# Patient Record
Sex: MALE | Race: WHITE | Employment: FULL TIME | URBAN - METROPOLITAN AREA
[De-identification: names, ages, dates, MRNs, and addresses within clinical notes are randomized per-mention and may not be internally consistent; named-entity substitution may affect disease eponyms.]

---

## 2019-12-31 ENCOUNTER — TRANSFERRED RECORDS (OUTPATIENT)
Dept: PHYSICAL THERAPY | Facility: CLINIC | Age: 54
End: 2019-12-31

## 2020-01-02 ENCOUNTER — THERAPY VISIT (OUTPATIENT)
Dept: PHYSICAL THERAPY | Facility: CLINIC | Age: 55
End: 2020-01-02
Payer: COMMERCIAL

## 2020-01-02 DIAGNOSIS — M54.50 LOW BACK PAIN RADIATING TO RIGHT LOWER EXTREMITY: ICD-10-CM

## 2020-01-02 DIAGNOSIS — M79.604 LOW BACK PAIN RADIATING TO RIGHT LOWER EXTREMITY: ICD-10-CM

## 2020-01-02 PROCEDURE — 97110 THERAPEUTIC EXERCISES: CPT | Mod: GP | Performed by: PHYSICAL THERAPIST

## 2020-01-02 PROCEDURE — 97161 PT EVAL LOW COMPLEX 20 MIN: CPT | Mod: GP | Performed by: PHYSICAL THERAPIST

## 2020-01-02 PROCEDURE — 97530 THERAPEUTIC ACTIVITIES: CPT | Mod: GP | Performed by: PHYSICAL THERAPIST

## 2020-01-02 PROCEDURE — 97112 NEUROMUSCULAR REEDUCATION: CPT | Mod: GP | Performed by: PHYSICAL THERAPIST

## 2020-01-02 NOTE — PROGRESS NOTES
[facility] Initial Evaluation  Subjective:  The history is provided by the patient. No  was used.   Mark Callejas being seen for low back pain.   Problem began 12/26/2019. Where condition occurred: at home, at work and in the community.Problem occurred: sore from moving boxes around for [place]. was taking Ibuprophen then fell on the ice and things have gotten worse since then  and reported as 8/10 (3/10. Hard to tell as I stop doing anything that hurts) on pain scale. General health as reported by patient is fair. Pertinent medical history includes:  Concussions/dizziness, numbness/tingling and overweight.   Other medical allergies details: penicillian.   Other surgery history details: toe 30 years ago.  Current medications:  Muscle relaxants and anti-inflammatory.   Primary job tasks include:  Computer work and prolonged sitting.  Pain is described as aching, burning, cramping, sharp, shooting and stabbing and is intermittent. Pain is worse during the day. Since onset symptoms are gradually improving.      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    Barriers include:  None as reported by patient (has some difficulty but I can do them meaning stairs).  Red flags:  Other (R big toe numbness doesn't trust the foot to support him when walking).   Condition occurred with:  A fall/slip.    Problem details: Ache for 2 months then  fell on the ice on the 28th made worse  Hip extension increases his back pain  calf pain12/31 just in calf comes when he does stretches feel fine right now  Patient to PT with one crutch due to feels like foot does not want to support him  Feels like may have some swelling  Feels like balance is off today he thinks due to his muscle relaxers  Likes hot showers got into tub and soaked a long time. Observing how poorly pt is moving I recommended pool and or  "whirlpool at his health club to help get him moving easier. Recommend water slippers  Pt had great difficulty going from sitting to standing \" can't bend\"  Feels he needs a new mattress. Recommended press board in between mattress and box spring.  .   Patient reports pain:  Lumbar spine right. Radiates to:  Foot right (to R calf). Associated symptoms:  Loss of motion/stiffness, numbness, tingling and loss of balance (balance he thinks is from meds). Symptoms are exacerbated by sitting, walking and standing and relieved by other (laying down and not moving relieves his pain).                      Objective:  Standing Alignment:    Cervical/Thoracic:  Forward head and flat thoracic upper spine  Shoulder/UE:  Rounded shoulders, protracted scapula L and protracted scapula R  Lumbar:  Lordosis incr, anterior pelvic tilt and high lumbar lordosis (lordosis goes up in to lower Thoracic spine)                           Lumbar/SI Evaluation  ROM:    AROM Lumbar:   Flexion:          0 motion from hip  Ext:                    10% no pain   Side Bend:        Left:  0     Right:  Rotates  Rotation:           Left:     Right:   Side Glide:        Left:     Right:                   Neural Tension/Mobility:      Left side:Femoral Nerve  negative.   Right side:   Slump positive.  Right side:   Femoral Nerve  negative.         Spinal Segmental Conclusions:         Level: Hypo note at L3, L4 and L5                                           the longer pt stood the more his pain and stiffness increased. Could not really perform March test too unstable standing on one leg even hanging onto table  Is starting to get radicular sx down RLE to foot with numbness and pt does not trust leg to hold him up  Tried manual traction no change.  Pt often gave vague answers when asked it better worse or no change often did not answer? Had to repeat questions     Prone with PT side gliding his lumbar spine  I could not tell by his response if anything " was helping or making worse. Question is his muscles relaxer's affecting him?  With supine his low back had increasing pain with his back pressing on table.  If this conts will send back to [facility] with sudden hard fall ? If needs  X rays   Pt got worse as we tried to eval his back today with increasing LLE sx. Advised to ice more    Pt carrying heavy back pack advised to wear it so wt centered on back  Pt stayed out in waiting room 30 min after session before he felt well enough to leave.Pt leaning heavily o his crutch Had I known I would have given him an ice pack.        General Evaluation:            Palpation:    Significant findings:  Paraspinal very taut in lower Thoracic and lumbar spine                                                       Review of Systems   Neurological: Positive for dizziness.   All other systems reviewed and are negative.  ? From his muscle relaxers normally does not have dizziness    Assessment/Plan:    Patient is a 54 year old male with lumbar and sacral complaints.    Patient has the following significant findings with corresponding treatment plan.                Diagnosis 1:  Low back pain with  Radicular sx now  Pain -  hot/cold therapy, US, mechanical traction, manual therapy, self management, education and home program  Decreased ROM/flexibility - manual therapy, therapeutic exercise, therapeutic activity and home program  Decreased joint mobility - manual therapy, therapeutic exercise, therapeutic activity and home program  Impaired gait - gait training, assistive devices and home program  Impaired muscle performance - neuro re-education and home program  Decreased function - therapeutic activities and home program  Cumulative Therapy Evaluation is: Low complexity.    Previous and current functional limitations:  (See Goal Flow Sheet for this information)    Short term and Long term goals: (See Goal Flow Sheet for this information)     Communication ability:  Patient appears to be  able to clearly communicate and understand verbal and written communication and follow directions correctly.  Treatment Explanation - The following has been discussed with the patient:   RX ordered/plan of care  Anticipated outcomes  Possible risks and side effects  This patient would benefit from PT intervention to resume normal activities.   Rehab potential is good.    Frequency:  1 X week, once daily  Duration:  for 8 weeks  Discharge Plan:  Achieve all LTG.  Independent in home treatment program.  Return to previous functional level by discharge.  Reach maximal therapeutic benefit.    Please refer to the daily flowsheet for treatment today, total treatment time and time spent performing 1:1 timed codes.

## 2020-01-02 NOTE — LETTER
[facility]  [address]  [facility] [address]  [phone]    2020    Re: Mark Callejas   :   1965  MRN:  [id number]   REFERRING PHYSICIAN:   Miquel Funes    [facility]    Date of Initial Evaluation:  2020  Visits:  Rxs Used: 1  Reason for Referral:  Low back pain radiating to right lower extremity    EVALUATION SUMMARY    Subjective:  The history is provided by the patient. No  was used.   Mark Callejas being seen for low back pain.   Problem began 2019. Where condition occurred: at home, at work and in the community.Problem occurred: sore from moving boxes around for [place]. was taking Ibuprophen then fell on the ice and things have gotten worse since then  and reported as 8/10 (3/10. Hard to tell as I stop doing anything that hurts) on pain scale. General health as reported by patient is fair. Pertinent medical history includes:  Concussions/dizziness, numbness/tingling and overweight.   Other medical allergies details: penicillian.   Other surgery history details: toe 30 years ago.  Current medications:  Muscle relaxants and anti-inflammatory.   Primary job tasks include:  Computer work and prolonged sitting.  Pain is described as aching, burning, cramping, sharp, shooting and stabbing and is intermittent. Pain is worse during the day. Since onset symptoms are gradually improving. 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  Barriers include:  None as reported by patient (has some difficulty but I can do them meaning stairs).  Red flags:  Other (R big toe numbness doesn't trust the foot to support him when walking).  Condition occurred with:  A fall/slip.    Problem details: Ache for 2 months then  fell on the ice on the  made  "worse  Hip extension increases his back pain  calf pain just in calf comes when he does stretches feel fine right now  Patient to PT with one crutch due to feels like foot does not want to support him  Feels like may have some swelling  Feels like balance is off today he thinks due to his muscle relaxers  Likes hot showers got into tub and soaked a long time. Observing how poorly pt is moving I recommended pool and or whirlpool at his health club to help get him moving easier. Recommend water slippers  Pt had great difficulty going from sitting to standing \" can't bend\"  Feels he needs a new mattress. Recommended press board in between mattress and box spring.  Patient reports pain:  Lumbar spine right. Radiates to:  Foot right (to R calf). Associated symptoms:  Loss of motion/stiffness, numbness, tingling and loss of balance   Re: Mark Callejas   :   1965      (balance he thinks is from meds). Symptoms are exacerbated by sitting, walking and standing and relieved by other (laying down and not moving relieves his pain).                Objective:  Standing Alignment:    Cervical/Thoracic:  Forward head and flat thoracic upper spine  Shoulder/UE:  Rounded shoulders, protracted scapula L and protracted scapula R  Lumbar:  Lordosis incr, anterior pelvic tilt and high lumbar lordosis (lordosis goes up in to lower Thoracic spine)       Lumbar/SI Evaluation  ROM:    AROM Lumbar:   Flexion:          0 motion from hip  Ext:                    10% no pain   Side Bend:        Left:  0     Right:  Rotates  Rotation:           Left:     Right:   Side Glide:        Left:     Right:   Neural Tension/Mobility:    Left side:Femoral Nerve  negative.   Right side:   Slump positive.  Right side:   Femoral Nerve  negative.   Spinal Segmental Conclusions:   Level: Hypo note at L3, L4 and L5   the longer pt stood the more his pain and stiffness increased. Could not really perform March test too unstable standing on one leg " even hanging onto table  Is starting to get radicular sx down RLE to foot with numbness and pt does not trust leg to hold him up  Tried manual traction no change.  Pt often gave vague answers when asked it better worse or no change often did not answer? Had to repeat questions   Prone with PT side gliding his lumbar spine  I could not tell by his response if anything was helping or making worse. Question is his muscles relaxer's affecting him?  With supine his low back had increasing pain with his back pressing on table.  If this conts will send back to MD with sudden hard fall ? If needs  X rays   Pt got worse as we tried to eval his back today with increasing LLE sx. Advised to ice more  Pt carrying heavy back pack advised to wear it so wt centered on back  Pt stayed out in waiting room 30 min after session before he felt well enough to leave.Pt leaning heavily on his crutch. Had I known I would have given him an ice pack.      General Evaluation:  Palpation:    Significant findings:  Paraspinal very taut in lower Thoracic and lumbar spine  Neurological: Positive for dizziness.   All other systems reviewed and are negative.  ? From his muscle relaxers normally does not have dizziness    Assessment/Plan:    Patient is a 54 year old male with lumbar and sacral complaints.    Patient has the following significant findings with corresponding treatment plan.                Diagnosis 1:  Low back pain with  Radicular sx now  Pain -  hot/cold therapy, US, mechanical traction, manual therapy, self management, education and home program  Decreased ROM/flexibility - manual therapy, therapeutic exercise, therapeutic activity and home program  Decreased joint mobility - manual therapy, therapeutic exercise, therapeutic activity and home program  Impaired gait - gait training, assistive devices and home program  Impaired muscle performance - neuro re-education and home program  Decreased function - therapeutic activities and  home program  Cumulative Therapy Evaluation is: Low complexity.    Previous and current functional limitations:  (See Goal Flow Sheet for this information)    Short term and Long term goals: (See Goal Flow Sheet for this information)     Communication ability:  Patient appears to be able to clearly communicate and understand verbal and written communication and follow directions correctly.  Treatment Explanation - The following has been discussed with the patient:   RX ordered/plan of care  Anticipated outcomes  Possible risks and side effects  This patient would benefit from PT intervention to resume normal activities.   Rehab potential is good.    Frequency:  1 X week, once daily  Duration:  for 8 weeks  Discharge Plan:  Achieve all LTG.  Independent in home treatment program.  Return to previous functional level by discharge.  Reach maximal therapeutic benefit.    Thank you for your referral.      INQUIRIES  Therapist: Anna Marie Lizama, PT  [facility]  [address] [address]  Phone: [phone]  Fax: [phone]

## 2020-01-05 PROBLEM — M79.604 LOW BACK PAIN RADIATING TO RIGHT LOWER EXTREMITY: Status: ACTIVE | Noted: 2020-01-02

## 2020-01-05 PROBLEM — M54.50 LOW BACK PAIN RADIATING TO RIGHT LOWER EXTREMITY: Status: ACTIVE | Noted: 2020-01-02

## 2020-01-05 ASSESSMENT — ENCOUNTER SYMPTOMS: DIZZINESS: 1

## 2020-03-25 NOTE — PROGRESS NOTES
Patient seen for one time evaluation and treatment.  Patient canceled his next 2 scheduled appts and  did not return for further treatment and current status is unknown.  Please see initial evaluation for further information.

## 2020-05-13 ENCOUNTER — APPOINTMENT (OUTPATIENT)
Age: 55
Setting detail: DERMATOLOGY
End: 2020-05-13

## 2020-05-13 DIAGNOSIS — L82.1 OTHER SEBORRHEIC KERATOSIS: ICD-10-CM

## 2020-05-13 DIAGNOSIS — L57.8 OTHER SKIN CHANGES DUE TO CHRONIC EXPOSURE TO NONIONIZING RADIATION: ICD-10-CM

## 2020-05-13 DIAGNOSIS — D22 MELANOCYTIC NEVI: ICD-10-CM

## 2020-05-13 DIAGNOSIS — D18.0 HEMANGIOMA: ICD-10-CM

## 2020-05-13 PROBLEM — D22.5 MELANOCYTIC NEVI OF TRUNK: Status: ACTIVE | Noted: 2020-05-13

## 2020-05-13 PROBLEM — D18.01 HEMANGIOMA OF SKIN AND SUBCUTANEOUS TISSUE: Status: ACTIVE | Noted: 2020-05-13

## 2020-05-13 PROCEDURE — OTHER COUNSELING: OTHER

## 2020-05-13 PROCEDURE — 99214 OFFICE O/P EST MOD 30 MIN: CPT

## 2020-05-13 ASSESSMENT — LOCATION DETAILED DESCRIPTION DERM
LOCATION DETAILED: LEFT SUPERIOR MEDIAL MIDBACK
LOCATION DETAILED: LEFT MEDIAL UPPER BACK
LOCATION DETAILED: LEFT INFERIOR MEDIAL UPPER BACK

## 2020-05-13 ASSESSMENT — LOCATION ZONE DERM: LOCATION ZONE: TRUNK

## 2020-05-13 ASSESSMENT — LOCATION SIMPLE DESCRIPTION DERM
LOCATION SIMPLE: LEFT UPPER BACK
LOCATION SIMPLE: LEFT LOWER BACK

## 2021-05-12 ENCOUNTER — APPOINTMENT (OUTPATIENT)
Dept: URBAN - METROPOLITAN AREA CLINIC 259 | Age: 56
Setting detail: DERMATOLOGY
End: 2021-05-12

## 2021-05-12 DIAGNOSIS — L20.89 OTHER ATOPIC DERMATITIS: ICD-10-CM

## 2021-05-12 DIAGNOSIS — Z71.89 OTHER SPECIFIED COUNSELING: ICD-10-CM

## 2021-05-12 DIAGNOSIS — L81.4 OTHER MELANIN HYPERPIGMENTATION: ICD-10-CM

## 2021-05-12 DIAGNOSIS — L82.1 OTHER SEBORRHEIC KERATOSIS: ICD-10-CM

## 2021-05-12 DIAGNOSIS — B35.3 TINEA PEDIS: ICD-10-CM

## 2021-05-12 DIAGNOSIS — L57.8 OTHER SKIN CHANGES DUE TO CHRONIC EXPOSURE TO NONIONIZING RADIATION: ICD-10-CM

## 2021-05-12 DIAGNOSIS — D18.0 HEMANGIOMA: ICD-10-CM

## 2021-05-12 DIAGNOSIS — D22 MELANOCYTIC NEVI: ICD-10-CM

## 2021-05-12 PROBLEM — D23.71 OTHER BENIGN NEOPLASM OF SKIN OF RIGHT LOWER LIMB, INCLUDING HIP: Status: ACTIVE | Noted: 2021-05-12

## 2021-05-12 PROBLEM — L20.84 INTRINSIC (ALLERGIC) ECZEMA: Status: ACTIVE | Noted: 2021-05-12

## 2021-05-12 PROBLEM — D22.5 MELANOCYTIC NEVI OF TRUNK: Status: ACTIVE | Noted: 2021-05-12

## 2021-05-12 PROBLEM — D18.01 HEMANGIOMA OF SKIN AND SUBCUTANEOUS TISSUE: Status: ACTIVE | Noted: 2021-05-12

## 2021-05-12 PROCEDURE — OTHER COUNSELING: OTHER

## 2021-05-12 PROCEDURE — OTHER MIPS QUALITY: OTHER

## 2021-05-12 PROCEDURE — OTHER PRESCRIPTION MEDICATION MANAGEMENT: OTHER

## 2021-05-12 PROCEDURE — 99214 OFFICE O/P EST MOD 30 MIN: CPT

## 2021-05-12 ASSESSMENT — LOCATION DETAILED DESCRIPTION DERM
LOCATION DETAILED: 1ST WEBSPACE RIGHT FOOT
LOCATION DETAILED: RIGHT SUPERIOR MEDIAL UPPER BACK
LOCATION DETAILED: 1ST WEBSPACE LEFT FOOT
LOCATION DETAILED: RIGHT INFERIOR MEDIAL UPPER BACK
LOCATION DETAILED: INFERIOR THORACIC SPINE
LOCATION DETAILED: LEFT INFERIOR MEDIAL UPPER BACK
LOCATION DETAILED: LEFT MEDIAL UPPER BACK

## 2021-05-12 ASSESSMENT — LOCATION SIMPLE DESCRIPTION DERM
LOCATION SIMPLE: UPPER BACK
LOCATION SIMPLE: RIGHT UPPER BACK
LOCATION SIMPLE: LEFT UPPER BACK
LOCATION SIMPLE: LEFT FOOT
LOCATION SIMPLE: RIGHT FOOT

## 2021-05-12 ASSESSMENT — LOCATION ZONE DERM
LOCATION ZONE: TRUNK
LOCATION ZONE: FEET

## 2021-05-12 NOTE — PROCEDURE: MIPS QUALITY
Quality 110: Preventive Care And Screening: Influenza Immunization: Influenza Immunization Ordered or Recommended, but not Administered due to system reason
Quality 130: Documentation Of Current Medications In The Medical Record: Current Medications Documented
Detail Level: Detailed
Quality 111:Pneumonia Vaccination Status For Older Adults: Pneumococcal Vaccination not Administered or Previously Received, Reason not Otherwise Specified
Quality 226: Preventive Care And Screening: Tobacco Use: Screening And Cessation Intervention: Patient screened for tobacco use and is an ex/non-smoker
Quality 431: Preventive Care And Screening: Unhealthy Alcohol Use - Screening: Patient screened for unhealthy alcohol use using a single question and scores less than 2 times per year

## 2021-05-12 NOTE — PROCEDURE: PRESCRIPTION MEDICATION MANAGEMENT
Plan: Recommended OTC anti fungal. Ok to call in for ketoconazole cream to use BID if OTC cream is not effective
Detail Level: Zone
Plan: Patient states this does not bother him. Ok to call in for triamcinolone cream twice a day as needed
Render In Strict Bullet Format?: No

## 2022-05-18 ENCOUNTER — APPOINTMENT (OUTPATIENT)
Dept: URBAN - METROPOLITAN AREA CLINIC 259 | Age: 57
Setting detail: DERMATOLOGY
End: 2022-05-19

## 2022-05-18 DIAGNOSIS — Z71.89 OTHER SPECIFIED COUNSELING: ICD-10-CM

## 2022-05-18 DIAGNOSIS — B35.3 TINEA PEDIS: ICD-10-CM

## 2022-05-18 DIAGNOSIS — L57.0 ACTINIC KERATOSIS: ICD-10-CM

## 2022-05-18 DIAGNOSIS — L57.8 OTHER SKIN CHANGES DUE TO CHRONIC EXPOSURE TO NONIONIZING RADIATION: ICD-10-CM

## 2022-05-18 DIAGNOSIS — L81.4 OTHER MELANIN HYPERPIGMENTATION: ICD-10-CM

## 2022-05-18 DIAGNOSIS — D22 MELANOCYTIC NEVI: ICD-10-CM

## 2022-05-18 DIAGNOSIS — L30.0 NUMMULAR DERMATITIS: ICD-10-CM

## 2022-05-18 DIAGNOSIS — L82.1 OTHER SEBORRHEIC KERATOSIS: ICD-10-CM

## 2022-05-18 DIAGNOSIS — D18.0 HEMANGIOMA: ICD-10-CM

## 2022-05-18 PROBLEM — D18.01 HEMANGIOMA OF SKIN AND SUBCUTANEOUS TISSUE: Status: ACTIVE | Noted: 2022-05-18

## 2022-05-18 PROBLEM — D22.5 MELANOCYTIC NEVI OF TRUNK: Status: ACTIVE | Noted: 2022-05-18

## 2022-05-18 PROCEDURE — OTHER PRESCRIPTION MEDICATION MANAGEMENT: OTHER

## 2022-05-18 PROCEDURE — OTHER MIPS QUALITY: OTHER

## 2022-05-18 PROCEDURE — OTHER COUNSELING: OTHER

## 2022-05-18 PROCEDURE — 17000 DESTRUCT PREMALG LESION: CPT

## 2022-05-18 PROCEDURE — 99214 OFFICE O/P EST MOD 30 MIN: CPT | Mod: 25

## 2022-05-18 PROCEDURE — OTHER PRESCRIPTION: OTHER

## 2022-05-18 PROCEDURE — OTHER LIQUID NITROGEN: OTHER

## 2022-05-18 RX ORDER — HYDROCORTISONE 25 MG/G
CREAM TOPICAL
Qty: 30 | Refills: 3 | Status: ERX | COMMUNITY
Start: 2022-05-18

## 2022-05-18 RX ORDER — KETOCONAZOLE 20 MG/G
CREAM TOPICAL
Qty: 60 | Refills: 3 | Status: ERX | COMMUNITY
Start: 2022-05-18

## 2022-05-18 ASSESSMENT — LOCATION SIMPLE DESCRIPTION DERM
LOCATION SIMPLE: LOWER BACK
LOCATION SIMPLE: ANTERIOR SCALP
LOCATION SIMPLE: LEFT 5TH TOE
LOCATION SIMPLE: LEFT UPPER BACK
LOCATION SIMPLE: RIGHT 4TH TOE
LOCATION SIMPLE: RIGHT UPPER BACK
LOCATION SIMPLE: UPPER BACK

## 2022-05-18 ASSESSMENT — LOCATION DETAILED DESCRIPTION DERM
LOCATION DETAILED: INFERIOR THORACIC SPINE
LOCATION DETAILED: LEFT MEDIAL UPPER BACK
LOCATION DETAILED: MID-FRONTAL SCALP
LOCATION DETAILED: RIGHT SUPERIOR MEDIAL UPPER BACK
LOCATION DETAILED: LEFT INFERIOR MEDIAL UPPER BACK
LOCATION DETAILED: SUPERIOR LUMBAR SPINE
LOCATION DETAILED: RIGHT LATERAL 4TH TOE
LOCATION DETAILED: LEFT MEDIAL 5TH TOE

## 2022-05-18 ASSESSMENT — LOCATION ZONE DERM
LOCATION ZONE: TOE
LOCATION ZONE: TRUNK
LOCATION ZONE: SCALP

## 2022-05-18 NOTE — PROCEDURE: PRESCRIPTION MEDICATION MANAGEMENT
Initiate Treatment: Ketoconazole 2% cream BiID to affected areas
Detail Level: Zone
Initiate Treatment: Hydrocortisone 2.5% mixed 1:1 with ketoconazole 2% cream BID PRN
Render In Strict Bullet Format?: No

## 2022-05-18 NOTE — PROCEDURE: LIQUID NITROGEN
Duration Of Freeze Thaw-Cycle (Seconds): 1
Consent: The patient's consent was obtained including but not limited to risks of crusting, scabbing, blistering, scarring, darker or lighter pigmentary change, recurrence, incomplete removal and infection.
Post-Care Instructions: I reviewed with the patient in detail post-care instructions. Patient is to wear sunprotection, and avoid picking at any of the treated lesions. Pt may apply Vaseline to crusted or scabbing areas.
Show Applicator Variable?: Yes
Render Note In Bullet Format When Appropriate: No
Detail Level: Detailed
Number Of Freeze-Thaw Cycles: 1 freeze-thaw cycle

## 2022-05-18 NOTE — PROCEDURE: MIPS QUALITY
Detail Level: Generalized
Quality 130: Documentation Of Current Medications In The Medical Record: Current Medications Documented
Quality 431: Preventive Care And Screening: Unhealthy Alcohol Use - Screening: Patient not identified as an unhealthy alcohol user when screened for unhealthy alcohol use using a systematic screening method
Quality 110: Preventive Care And Screening: Influenza Immunization: Influenza Immunization Ordered or Recommended, but not Administered due to system reason
Quality 226: Preventive Care And Screening: Tobacco Use: Screening And Cessation Intervention: Patient screened for tobacco use and is an ex/non-smoker

## 2023-06-06 ENCOUNTER — APPOINTMENT (OUTPATIENT)
Dept: URBAN - METROPOLITAN AREA CLINIC 259 | Age: 58
Setting detail: DERMATOLOGY
End: 2023-06-06

## 2023-06-06 DIAGNOSIS — D22 MELANOCYTIC NEVI: ICD-10-CM

## 2023-06-06 DIAGNOSIS — L81.4 OTHER MELANIN HYPERPIGMENTATION: ICD-10-CM

## 2023-06-06 DIAGNOSIS — Z71.89 OTHER SPECIFIED COUNSELING: ICD-10-CM

## 2023-06-06 DIAGNOSIS — L57.8 OTHER SKIN CHANGES DUE TO CHRONIC EXPOSURE TO NONIONIZING RADIATION: ICD-10-CM

## 2023-06-06 DIAGNOSIS — L82.1 OTHER SEBORRHEIC KERATOSIS: ICD-10-CM

## 2023-06-06 DIAGNOSIS — D18.0 HEMANGIOMA: ICD-10-CM

## 2023-06-06 PROBLEM — D22.5 MELANOCYTIC NEVI OF TRUNK: Status: ACTIVE | Noted: 2023-06-06

## 2023-06-06 PROBLEM — D18.01 HEMANGIOMA OF SKIN AND SUBCUTANEOUS TISSUE: Status: ACTIVE | Noted: 2023-06-06

## 2023-06-06 PROCEDURE — OTHER MIPS QUALITY: OTHER

## 2023-06-06 PROCEDURE — OTHER COUNSELING: OTHER

## 2023-06-06 PROCEDURE — 99213 OFFICE O/P EST LOW 20 MIN: CPT

## 2023-06-06 ASSESSMENT — LOCATION DETAILED DESCRIPTION DERM
LOCATION DETAILED: LEFT SUPERIOR MEDIAL UPPER BACK
LOCATION DETAILED: LEFT MEDIAL UPPER BACK

## 2023-06-06 ASSESSMENT — LOCATION SIMPLE DESCRIPTION DERM: LOCATION SIMPLE: LEFT UPPER BACK

## 2023-06-06 ASSESSMENT — LOCATION ZONE DERM: LOCATION ZONE: TRUNK

## 2024-09-24 ENCOUNTER — APPOINTMENT (OUTPATIENT)
Dept: URBAN - METROPOLITAN AREA CLINIC 259 | Age: 59
Setting detail: DERMATOLOGY
End: 2024-09-24

## 2024-09-24 DIAGNOSIS — Z71.89 OTHER SPECIFIED COUNSELING: ICD-10-CM

## 2024-09-24 DIAGNOSIS — L82.1 OTHER SEBORRHEIC KERATOSIS: ICD-10-CM

## 2024-09-24 DIAGNOSIS — D18.0 HEMANGIOMA: ICD-10-CM

## 2024-09-24 DIAGNOSIS — L57.8 OTHER SKIN CHANGES DUE TO CHRONIC EXPOSURE TO NONIONIZING RADIATION: ICD-10-CM

## 2024-09-24 DIAGNOSIS — L81.4 OTHER MELANIN HYPERPIGMENTATION: ICD-10-CM

## 2024-09-24 DIAGNOSIS — D22 MELANOCYTIC NEVI: ICD-10-CM

## 2024-09-24 PROBLEM — D18.01 HEMANGIOMA OF SKIN AND SUBCUTANEOUS TISSUE: Status: ACTIVE | Noted: 2024-09-24

## 2024-09-24 PROBLEM — D22.5 MELANOCYTIC NEVI OF TRUNK: Status: ACTIVE | Noted: 2024-09-24

## 2024-09-24 PROCEDURE — OTHER COUNSELING: OTHER

## 2024-09-24 PROCEDURE — OTHER MIPS QUALITY: OTHER

## 2024-09-24 PROCEDURE — 99213 OFFICE O/P EST LOW 20 MIN: CPT

## 2024-09-24 ASSESSMENT — LOCATION DETAILED DESCRIPTION DERM
LOCATION DETAILED: LEFT SUPERIOR MEDIAL UPPER BACK
LOCATION DETAILED: LEFT MEDIAL UPPER BACK

## 2024-09-24 ASSESSMENT — LOCATION SIMPLE DESCRIPTION DERM: LOCATION SIMPLE: LEFT UPPER BACK

## 2024-09-24 ASSESSMENT — LOCATION ZONE DERM: LOCATION ZONE: TRUNK
